# Patient Record
Sex: MALE | Race: OTHER | Employment: OTHER | URBAN - METROPOLITAN AREA
[De-identification: names, ages, dates, MRNs, and addresses within clinical notes are randomized per-mention and may not be internally consistent; named-entity substitution may affect disease eponyms.]

---

## 2024-01-24 ENCOUNTER — OFFICE VISIT (OUTPATIENT)
Dept: CARDIOLOGY CLINIC | Facility: CLINIC | Age: 79
End: 2024-01-24
Payer: COMMERCIAL

## 2024-01-24 ENCOUNTER — TELEPHONE (OUTPATIENT)
Dept: CARDIOLOGY CLINIC | Facility: CLINIC | Age: 79
End: 2024-01-24

## 2024-01-24 VITALS
HEART RATE: 49 BPM | WEIGHT: 142 LBS | OXYGEN SATURATION: 97 % | SYSTOLIC BLOOD PRESSURE: 112 MMHG | DIASTOLIC BLOOD PRESSURE: 62 MMHG | BODY MASS INDEX: 22.82 KG/M2 | HEIGHT: 66 IN

## 2024-01-24 DIAGNOSIS — I49.5 TACHY-BRADY SYNDROME (HCC): ICD-10-CM

## 2024-01-24 DIAGNOSIS — I48.0 PAF (PAROXYSMAL ATRIAL FIBRILLATION) (HCC): ICD-10-CM

## 2024-01-24 DIAGNOSIS — E78.5 DYSLIPIDEMIA: ICD-10-CM

## 2024-01-24 DIAGNOSIS — I25.119 CORONARY ARTERY DISEASE INVOLVING NATIVE CORONARY ARTERY OF NATIVE HEART WITH ANGINA PECTORIS (HCC): ICD-10-CM

## 2024-01-24 DIAGNOSIS — R06.09 DOE (DYSPNEA ON EXERTION): ICD-10-CM

## 2024-01-24 DIAGNOSIS — I10 ESSENTIAL (PRIMARY) HYPERTENSION: ICD-10-CM

## 2024-01-24 PROCEDURE — 99204 OFFICE O/P NEW MOD 45 MIN: CPT | Performed by: INTERNAL MEDICINE

## 2024-01-24 PROCEDURE — 93000 ELECTROCARDIOGRAM COMPLETE: CPT | Performed by: INTERNAL MEDICINE

## 2024-01-24 RX ORDER — LEVOTHYROXINE SODIUM 0.05 MG/1
50 TABLET ORAL DAILY
COMMUNITY
Start: 2024-01-11

## 2024-01-24 RX ORDER — AMLODIPINE BESYLATE 5 MG/1
5 TABLET ORAL DAILY
COMMUNITY

## 2024-01-24 RX ORDER — TELMISARTAN 80 MG/1
40 TABLET ORAL 2 TIMES DAILY
COMMUNITY

## 2024-01-24 RX ORDER — ATORVASTATIN CALCIUM 20 MG/1
20 TABLET, FILM COATED ORAL DAILY
COMMUNITY

## 2024-01-24 RX ORDER — CLOPIDOGREL BISULFATE 75 MG/1
75 TABLET ORAL DAILY
COMMUNITY

## 2024-01-24 RX ORDER — FLECAINIDE ACETATE 50 MG/1
50 TABLET ORAL 2 TIMES DAILY
COMMUNITY

## 2024-01-24 NOTE — PROGRESS NOTES
Consultation - Cardiology Office  [facility].    Que Harrison 78 y.o. male MRN: [id number]  : 1945  Unit/Bed#: ? Encounter: [id number]      Assessment:     1. DEXTER (dyspnea on exertion)    2. Coronary artery disease involving native coronary artery of native heart with angina pectoris (HCC)    3. PAF (paroxysmal atrial fibrillation) (HCC)    4. Essential (primary) hypertension    5. Dyslipidemia    6. Tachy-ar syndrome (HCC)        Discussion summary and Plan:    1.  Dyspnea on exertion.  Patient has dyspnea on exertion.  Etiology could be multifactorial.  He never smoked.  Possible causes could be due to chronotropic incompetency versus ischemia as he had history of coronary artery disease.  His metoprolol was already cut back from 50 mg twice to 25.  We are worried about recurrence of atrial fibrillation.  In the meantime we will schedule him for nuclear stress test with exercise and will check chronotropic incompetency as well as ischemia.    2.  Coronary artery disease with exertional shortness of breath could be angina accumulated.  He had history of stents for stent was placed in .    3.  Paroxysmal atrial fibrillation currently in sinus but bradycardic.  There may be a component of tachycardia-bradycardia syndrome.  He is on metoprolol 25 twice a day.  He is also on flecainide which he does not take regularly he only takes it when he feels palpitations.  We have to assess his A-fib burden.  His CH2DH VASc score is 4 he need to be on Eliquis.  He is reluctant to take full dose Eliquis.  After extensive discussion we started him on Eliquis 2.5 twice a day.  He is around 79-year-old and his weight is 62 kg's.    4.  Hypertensive heart disease without heart failure.  Blood pressure is well-controlled with amlodipine, telmisartan.  His creatinine is around 1.02.    5.  Dyslipidemia on statins.  LDL is around 70 continue statins    6.  Tachybradycardia syndrome with current  heart rate around 49 bpm.  Monitor ordered.  If he has significant bradycardia or have chronotropic incompetency he may end up with pacemaker.    All this issue discussed with him and his daughter.  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      Patient / Caretaker was advised and educated to call our office  immediately if  patient has any new symptoms of chest pain/shortness of breath, near-syncope, syncope, light headedness sustained palpitations or any other cardiovascular symptoms before their scheduled follow-up appointment.  Office number was provided #[phone].      Thank you for your consultation.  If you have any question please call me at [phone]    Counseling :  A description of the counseling.  Goals and Barriers.  Patient's ability to self care: Yes  Medication side effect reviewed with patient in detail and all their questions answered to their satisfaction.      Primary Care Physician Requesting Consult: Javier Swift    Reason for Consult / Principal Problem: Dyspnea on exertion, multiple cardiac issues.        HPI :     Que Harrison is a 78 y.o. year old male who was referred by primary care doctor for above-mentioned problem.  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 where his stent was placed in LAD and RCA, history of paroxysmal atrial fibrillation currently in sinus but bradycardic, dyslipidemia, prediabetes mellitus, hypothyroidism who has noted some dyspnea on exertion and fatigue and tiredness.  He occasionally get.'s of palpitations and atrial fibrillation and he keeps track of that.  He was diagnosed to have atrial fibrillation in 2012 and was put on amiodarone which he took for 5 years but it was stopped because he was getting lung issues and hypothyroidism.   He is doing well.  He has noted that his heart rate was running 35 to 50s and his metoprolol was cut back from 50-25 twice a day.  He does not take flecainide all the time he takes it whenever he feels that he has episode of atrial fibrillation and generally goes back to regular rhythm within 24-hour to 48 hours spontaneously.  He has no nausea no vomiting.  Occasionally lower extremity swelling.  No other cardiovascular issues.  He has no other recent surgery    Review of Systems   Constitutional:  Negative for activity change, chills, diaphoresis, fever and unexpected weight change.   HENT:  Negative for congestion.    Eyes:  Negative for discharge and redness.   Respiratory:  Positive for shortness of breath. Negative for cough, chest tightness and wheezing.         Exertional shortness of breath   Cardiovascular: Negative.  Negative for chest pain, palpitations and leg swelling.   Gastrointestinal:  Negative for abdominal pain, diarrhea and nausea.   Endocrine: Negative.    Genitourinary:  Negative for decreased urine volume and urgency.   Musculoskeletal: Negative.  Negative for arthralgias, back pain and gait problem.   Skin:  Negative for rash and wound.   Allergic/Immunologic: Negative.    Neurological:  Negative for dizziness, seizures, syncope, weakness, light-headedness and headaches.   Hematological: Negative.    Psychiatric/Behavioral:  Negative for agitation and confusion. The patient is not nervous/anxious.        Historical Information   History reviewed. No pertinent past medical history.  History reviewed. No pertinent surgical history.  Social History     Substance and Sexual Activity   Alcohol Use Never     Social History     Substance and Sexual Activity   Drug Use Never     Social History     Tobacco Use   Smoking Status Never   Smokeless Tobacco Never     Family History: History reviewed. No pertinent family history.    Meds/Allergies     Allergies   Allergen Reactions   • Aspirin Other (See  "Comments)     Difficulty breathing     • Diclofenac Other (See Comments)     Gastritis         Current Outpatient Medications:   •  amLODIPine (NORVASC) 5 mg tablet, Take 5 mg by mouth daily, Disp: , Rfl:   •  apixaban (Eliquis) 5 mg, Take 5 mg by mouth 2 (two) times a day PRN with flecainide, Disp: , Rfl:   •  atorvastatin (LIPITOR) 20 mg tablet, Take 20 mg by mouth daily, Disp: , Rfl:   •  clopidogrel (PLAVIX) 75 mg tablet, Take 75 mg by mouth daily, Disp: , Rfl:   •  flecainide (TAMBOCOR) 50 mg tablet, Take 50 mg by mouth 2 (two) times a day PRN, Disp: , Rfl:   •  levothyroxine 50 mcg tablet, Take 50 mcg by mouth daily, Disp: , Rfl:   •  metoprolol tartrate (LOPRESSOR) 25 mg tablet, Take 25 mg by mouth every 12 (twelve) hours, Disp: , Rfl:   •  telmisartan (MICARDIS) 80 MG tablet, Take 40 mg by mouth 2 (two) times a day, Disp: , Rfl:     Vitals: Blood pressure 112/62, pulse (!) 49, height 5' 5.5\" (1.664 m), weight 64.4 kg (142 lb), SpO2 97%.  ?  Body mass index is 23.27 kg/m².  Wt Readings from Last 3 Encounters:   01/24/24 64.4 kg (142 lb)     Vitals:    01/24/24 1300   Weight: 64.4 kg (142 lb)     BP Readings from Last 3 Encounters:   01/24/24 112/62         Physical Exam    Neurologic:  Alert & oriented x 3, no new focal deficits, Not in any acute distress,  Constitutional:  Adequate built, non-toxic appearance   Neck: Normal range of motion, no tenderness,  Neck supple   Respiratory:  Bilateral air entry, mostly clear to auscultation  Cardiovascular: S1-S2 regular with a 2/6 ejection systolic murmur and S4 is present  GI:  Soft, nondistended,  nontender, no hepatosplenomegaly appreciated.  Musculoskeletal:  No edema, no tenderness, no deformities.   Skin:  Well hydrated, no rash   Extremities:  No edema and distal pulses are present  Psychiatric:  Speech and behavior appropriate     Diagnostic Studies Review Cardio:  None recently.    EKG:  Twelve-lead EKG 1/24/2024 shows sinus bradycardia heart rate 49 " "bpm.        Dr. Tiburcio Bob MD [facility]      \"This note was completed in part utilizing [employer] direct voice recognition software.   Grammatical errors, random word insertion, spelling mistakes, and incomplete sentences may be an occasional consequence of the system secondary to software limitations, ambient noise and hardware issues.    Please read the chart carefully and recognize, using context, where substitutions have occurred.  If you have any questions or concerns about the context, text or information contained within the body of this dictation, please contact myself, the provider, for further clarification.\"  "

## 2024-01-30 ENCOUNTER — TELEPHONE (OUTPATIENT)
Dept: CARDIOLOGY CLINIC | Facility: CLINIC | Age: 79
End: 2024-01-30

## 2024-01-30 NOTE — TELEPHONE ENCOUNTER
I enrolled patient in three week biotel monitor.   Confirmed with Dr. Bob via TT.   Clinicals were faxed to Ana [facility].

## 2024-02-06 ENCOUNTER — TELEPHONE (OUTPATIENT)
Dept: CARDIOLOGY CLINIC | Facility: CLINIC | Age: 79
End: 2024-02-06